# Patient Record
Sex: FEMALE | Race: BLACK OR AFRICAN AMERICAN | NOT HISPANIC OR LATINO | Employment: UNEMPLOYED | URBAN - METROPOLITAN AREA
[De-identification: names, ages, dates, MRNs, and addresses within clinical notes are randomized per-mention and may not be internally consistent; named-entity substitution may affect disease eponyms.]

---

## 2019-08-20 PROBLEM — E10.9 DIABETES MELLITUS TYPE I: Status: ACTIVE | Noted: 2019-08-20

## 2019-12-17 PROBLEM — Z80.3 FAMILY HISTORY OF BREAST CANCER: Status: ACTIVE | Noted: 2019-12-17

## 2021-01-29 PROBLEM — I10 ESSENTIAL HYPERTENSION: Status: ACTIVE | Noted: 2021-01-29

## 2022-02-24 PROBLEM — L73.2 AXILLARY HIDRADENITIS SUPPURATIVA: Status: ACTIVE | Noted: 2022-02-24

## 2022-02-24 PROBLEM — Z00.00 HEALTHCARE MAINTENANCE: Status: ACTIVE | Noted: 2022-02-24

## 2022-05-30 PROBLEM — Z00.00 HEALTHCARE MAINTENANCE: Status: RESOLVED | Noted: 2022-02-24 | Resolved: 2022-05-30

## 2022-09-09 ENCOUNTER — SPECIALTY PHARMACY (OUTPATIENT)
Dept: PHARMACY | Facility: CLINIC | Age: 22
End: 2022-09-09
Payer: MEDICAID

## 2022-09-09 DIAGNOSIS — L73.2 AXILLARY HIDRADENITIS SUPPURATIVA: Primary | ICD-10-CM

## 2022-09-09 NOTE — TELEPHONE ENCOUNTER
Gabo Sharma, this is Cuca Bolaños with [facility].  We are working on your prescription that your doctor has sent us. We will be working with your insurance to get this approved for you. We will be calling you along the way with updates on your medication.  If you have any questions, you can reach us at [phone].    Welcome call outcome: Patient/caregiver reached    Patient is aware that labs are needed and ordered. She will get them done asap and then PA process will begin

## 2022-09-16 NOTE — TELEPHONE ENCOUNTER
-PA approved from 09/16/2022 to 03/16/2023    Benefits Investigation      Insurance: Medicaid [employer]  Copay: $0

## 2022-09-19 ENCOUNTER — SPECIALTY PHARMACY (OUTPATIENT)
Dept: PHARMACY | Facility: CLINIC | Age: 22
End: 2022-09-19
Payer: MEDICAID

## 2022-09-19 NOTE — TELEPHONE ENCOUNTER
"Specialty Pharmacy - Initial Clinical Assessment    Specialty Medication Orders Linked to Encounter      Flowsheet Row Most Recent Value   Medication #1 adalimumab (HUMIRA,CF, PEN CROHNS-UC-HS) 80 mg/0.8 mL PnKt (Order#[id number], Rx#[id number])   Medication #2 adalimumab (HUMIRA,CF, PEN) 40 mg/0.4 mL PnKt (Order#[id number], Rx#[id number])          Patient Diagnosis   L73.2 - Axillary hidradenitis suppurativa    Subjective    Vanity Chayito Styles is a 22 y.o. female, who is followed by the specialty pharmacy service for management and education.    Recent Encounters       Date Type Provider Description    09/19/2022 Specialty Pharmacy Cuca Bolaños PharmD Initial Clinical Assessment    09/09/2022 Specialty Pharmacy Cuca Bolaños PharmD Referral Authorization          Clinical call attempts since last clinical assessment   9/16/2022  8:16 PM - Specialty Pharmacy - Clinical Assessment by Cuca Bolaños PharmD     Current Outpatient Medications   Medication Sig    adalimumab (HUMIRA,CF, PEN CROHNS-UC-HS) 80 mg/0.8 mL PnKt Inject 2 pens (160 mg total) into the skin on day 1 then 1 pen (80 mg total) on day 15.    adalimumab (HUMIRA,CF, PEN) 40 mg/0.4 mL PnKt Inject 0.4 mLs (40 mg total) into the skin every 7 days.    amLODIPine (NORVASC) 10 MG tablet Take 1 tablet (10 mg total) by mouth once daily.    BD VEO INSULIN SYRINGE UF 1 mL 31 gauge x 15/64" Syrg USE 1 SYRINGE TWICE DAILY AS DIRECTED    blood sugar diagnostic (ONETOUCH ULTRA BLUE TEST STRIP) Strp Use to check blood sugar 6-8 times daily.    blood sugar diagnostic (ONETOUCH ULTRA BLUE TEST STRIP) Strp 1 each by Misc.(Non-Drug; Combo Route) route 2 (two) times a day. DX CODE E 11.9  DXT BID  IDDM    blood-glucose meter Misc Provide 1 glucometer    insulin (LANTUS SOLOSTAR U-100 INSULIN) glargine 100 units/mL SubQ pen Inject 30 Units into the skin every evening.    insulin lispro (HUMALOG KWIKPEN INSULIN) 100 unit/mL pen Inject 8 Units into the skin 3 (three) times " daily with meals.    lancets (ONETOUCH ULTRASOFT LANCETS) Misc USE ONE  TO CHECK GLUCOSE 6 TO 8 TIMES DAILY AS NEEDED    lisinopriL (PRINIVIL,ZESTRIL) 5 MG tablet Take 1 tablet (5 mg total) by mouth once daily.    metFORMIN (GLUCOPHAGE-XR) 500 MG ER 24hr tablet Take 2 tablets (1,000 mg total) by mouth daily with breakfast.   Last reviewed on 9/19/2022  8:50 AM by Cuca Bolaños, PharmPILY    Review of patient's allergies indicates:  No Known AllergiesLast reviewed on  9/19/2022 8:48 AM by Cuca Bolaños    Drug Interactions    Drug interactions evaluated: yes  Clinically relevant drug interactions identified: no  Provided the patient with educational material regarding drug interactions: not applicable         Adverse Effects    Photosensitivity: Pos  Pruritus: Pos  Ulcers/sores: Pos       Assessment Questions - Documented Responses      Flowsheet Row Most Recent Value   Assessment    Medication Reconciliation completed for patient Yes   During the past 4 weeks, has patient missed any activities due to condition or medication? No   Number of Days missed 0   During the past 4 weeks, did patient have any of the following urgent care visits? None   Goals of Therapy Status Discussed (new start)   Status of the patients ability to self-administer: Is Able   All education points have been covered with patient? Yes, supplemental printed education provided   Welcome packet contents reviewed and discussed with patient? Yes   Assesment completed? Yes   Plan Therapy being initiated   Do you need to open a clinical intervention (i-vent)? No   Do you want to schedule first shipment? Yes          Refill Questions - Documented Responses      Flowsheet Row Most Recent Value   Patient Availability and HIPAA Verification    Does patient want to proceed with activity? Yes   HIPAA/medical authority confirmed? Yes   Relationship to patient of person spoken to? Self   Refill Screening Questions    When does the patient need to receive the  "medication? 09/20/22   Refill Delivery Questions    How will the patient receive the medication? Mail   When does the patient need to receive the medication? 09/20/22   Shipping Address Home   Address in [facility] confirmed and updated if neccessary? Yes   Expected Copay ($) 0   Is the patient able to afford the medication copay? Yes   Payment Method zero copay   Days supply of Refill 28   Supplies needed? No supplies needed   Refill activity completed? Yes   Refill activity plan Refill scheduled   Shipment/Pickup Date: 09/20/22            Objective    She has a past medical history of Diabetes mellitus type 2 in obese, Hidradenitis, and Obesity.    Tried/failed medications: doxycycline    BP Readings from Last 4 Encounters:   08/31/22 138/84   08/31/22 121/70   08/03/22 (!) 156/90   02/24/22 134/72     Ht Readings from Last 4 Encounters:   08/31/22 5' 2" (1.575 m)   08/31/22 5' 2" (1.575 m)   08/03/22 5' 2" (1.575 m)   02/24/22 5' 2" (1.575 m)     Wt Readings from Last 4 Encounters:   08/31/22 73 kg (161 lb)   08/31/22 73.4 kg (161 lb 12.8 oz)   08/03/22 71.7 kg (158 lb)   02/24/22 71.2 kg (156 lb 14.4 oz)     Recent Labs   Lab Result Units 09/12/22  0751 08/03/22  1639   Creatinine mg/dL 0.85 0.89   ALT U/L 17 21   AST U/L 11 12   Hepatitis B Surface Ag  Negative  --      The goals of prescribed drug therapy management include:  Supporting patient to meet the prescriber's medical treatment objectives  Improving or maintaining quality of life  Maintaining optimal therapy adherence  Minimizing and managing side effects      Goals of Therapy Status: Discussed (new start)    Assessment/Plan  Patient plans to start therapy on 09/20/22      Indication, dosage, appropriateness, effectiveness, safety and convenience of her specialty medication(s) were reviewed today.     Patient Education   Patient received education on the following:   Expectations and possible outcomes of therapy  Proper use, timely " administration, and missed dose management  Duration of therapy  Side effects, including prevention, minimization, and management  Contraindications and safety precautions  New or changed medications, including prescribe and over the counter medications and supplements  Reviews recommended vaccinations, as appropriate  Storage, safe handling, and disposal    Went over all education points, patient will call if needed to go over injection process again and go online to [email or web address] to watch injection video.     Tasks added this encounter   10/11/2022 - Refill Call (Auto Added)  6/19/2023 - Clinical - Follow Up Assesement (Annual)   Tasks due within next 3 months   No tasks due.     Cuca Bolaños, PharmD  [facility]  [address] [address]  Phone: [phone]  Fax: [phone]

## 2022-10-17 ENCOUNTER — SPECIALTY PHARMACY (OUTPATIENT)
Dept: PHARMACY | Facility: CLINIC | Age: 22
End: 2022-10-17
Payer: MEDICAID

## 2022-10-17 NOTE — TELEPHONE ENCOUNTER
Provider informed [facility] that he spoke with the patient and she will call [facility] if she wants to start medication. Will pen call out for 2 to 3 days to give patient time to consider.

## 2022-10-17 NOTE — TELEPHONE ENCOUNTER
Patient did not start medication. She is scared of adverse reactions, I tried to reassure her but she would also like a call from the doctors office to talk about the medication. Messaged [facility].

## 2023-02-14 PROBLEM — K04.7 TOOTH INFECTION: Status: ACTIVE | Noted: 2023-02-14

## 2023-11-07 PROBLEM — E10.3593 PROLIFERATIVE DIABETIC RETINOPATHY OF BOTH EYES WITHOUT MACULAR EDEMA ASSOCIATED WITH TYPE 1 DIABETES MELLITUS: Status: ACTIVE | Noted: 2023-11-07

## 2023-11-07 PROBLEM — H40.1132 PRIMARY OPEN ANGLE GLAUCOMA (POAG) OF BOTH EYES, MODERATE STAGE: Status: ACTIVE | Noted: 2023-11-07

## 2024-01-16 ENCOUNTER — PATIENT OUTREACH (OUTPATIENT)
Dept: ADMINISTRATIVE | Facility: HOSPITAL | Age: 24
End: 2024-01-16
Payer: MEDICAID

## 2024-01-16 DIAGNOSIS — E10.9 TYPE 1 DIABETES MELLITUS WITHOUT COMPLICATION: Primary | ICD-10-CM

## 2024-05-07 PROBLEM — E10.3592: Status: ACTIVE | Noted: 2024-05-07

## 2024-05-07 PROBLEM — E10.3511: Status: ACTIVE | Noted: 2023-11-07

## 2024-06-11 PROBLEM — H40.1131 PRIMARY OPEN ANGLE GLAUCOMA (POAG) OF BOTH EYES, MILD STAGE: Status: ACTIVE | Noted: 2023-11-07

## 2024-08-13 PROBLEM — Z83.511 FAMILY HISTORY OF GLAUCOMA IN MOTHER: Status: ACTIVE | Noted: 2024-08-13

## 2025-08-04 ENCOUNTER — PATIENT OUTREACH (OUTPATIENT)
Dept: ADMINISTRATIVE | Facility: HOSPITAL | Age: 25
End: 2025-08-04
Payer: MEDICAID

## 2025-08-04 NOTE — PROGRESS NOTES
8-4-25- please address open care gap collect A1c and cervical screening noted on 8-5-25 appt notes. . If screening completed at outside facility, please get sign HEYDI, to request records.

## 2025-09-05 ENCOUNTER — PATIENT OUTREACH (OUTPATIENT)
Dept: ADMINISTRATIVE | Facility: HOSPITAL | Age: 25
End: 2025-09-05
Payer: MEDICAID